# Patient Record
(demographics unavailable — no encounter records)

---

## 2022-01-01 NOTE — PROGRESS NOTE PEDS - NS_NEODISCHDATA_OBGYN_N_OB_FT
Immunizations:        Synagis:       Screenings:    Latest CCHD screen:      Latest car seat screen:      Latest hearing screen:        [place] screen:

## 2022-01-01 NOTE — CONSULT NOTE PEDS - SUBJECTIVE AND OBJECTIVE BOX
CHIEF COMPLAINT: fetal alert for infra-TAPVR    HISTORY OF PRESENT ILLNESS: MARIN JANG is a 0d old female with prenatal concern for infradiaphragmatic TAPVR. The pregnancy was further complicated by placental percreta and delivery was a planned CS @ 34 weeks. During the delivery, it was noted that meconium was spilling from umbilicus concerning for bowel perforation of unclear etiology. Patient had labile hemodynamics with saturations in the 60s, BPs 40/20s. . Decision made to place infant on [facility] ECMO emergently.    REVIEW OF SYSTEMS:  Unobtainable. Critically ill  placed on ECMO @ birth.    PAST MEDICAL HISTORY:  Medical Problems -   Allergies - none known    PAST SURGICAL HISTORY:  ECMO on hour of life 1.    MEDICATIONS:    FAMILY HISTORY:  There is no history of congenital heart disease, arrhythmias, or sudden cardiac death in family members.    SOCIAL HISTORY:  The patient lives with family.    PHYSICAL EXAMINATION:  Vital signs -   T(C): --  HR: --  BP: --  ABP: --  RR: --  SpO2: --  CVP(mm Hg): --  General - non-dysmorphic appearance, well-developed, in no distress.  Skin - no rash, no cyanosis.  Eyes / ENT - no conjunctival injection, external ears & nares normal, mucous membranes moist.  Pulmonary - normal inspiratory effort, no retractions, lungs clear to auscultation bilaterally, no wheezes, no rales.  Cardiovascular - normal rate, regular rhythm, normal S1 & S2, no murmurs, no rubs, no gallops, capillary refill < 2sec, normal pulses.  Gastrointestinal - soft, non-distended, non-tender, no hepatomegaly.  Musculoskeletal - no clubbing, no edema.  Neurologic / Psychiatric - moves all extremities, normal tone.    IMAGING STUDIES:  Electrocardiogram - (*date)     Telemetry - (*dates) normal sinus rhythm, no ectopy, no arrhythmias.    Chest x-ray - (*date) * cardiac silhouette, * pulmonary vascular markings.    Echocardiogram - (22) CHIEF COMPLAINT: fetal alert for infra-TAPVR    HISTORY OF PRESENT ILLNESS: MARIN JANG is a 0d old female with prenatal concern for infradiaphragmatic TAPVR. The pregnancy was further complicated by placental percreta and delivery was a planned CS @ 34 weeks. During the delivery, it was noted that meconium was spilling from umbilicus concerning for bowel perforation of unclear etiology. Patient had labile hemodynamics with saturations in the 60s, BPs 40/20s. . Decision made to place infant on [facility] ECMO emergently.    REVIEW OF SYSTEMS:  Unobtainable. Critically ill  placed on ECMO @ birth.    PAST MEDICAL HISTORY:  Medical Problems -   Allergies - none known    PAST SURGICAL HISTORY:  ECMO on hour of life 1.    MEDICATIONS:    FAMILY HISTORY:  There is no history of congenital heart disease, arrhythmias, or sudden cardiac death in family members.    SOCIAL HISTORY:  The patient lives with family.    PHYSICAL EXAMINATION:  Vital signs -   T(C): --  HR: 160  SpO2: non-pulsatile, on full flow VA ECMO    General - intubated, sedated, paralyzed  Skin - no rash, no cyanosis.  Eyes / ENT - external ears & nares normal  Pulmonary -  lungs clear to auscultation bilaterally  Cardiovascular - normal rate, regular rhythm, normal S1 & S2, normal pulses.  Gastrointestinal - soft, non-distended, non-tender, no hepatomegaly.  Musculoskeletal - no clubbing, no edema.  Neurologic / Psychiatric - intubated, sedated, paralyzed    IMAGING STUDIES:  Electrocardiogram - (*date)     Telemetry - (*dates) normal sinus rhythm, no ectopy, no arrhythmias.    Chest x-ray - (*date) * cardiac silhouette, * pulmonary vascular markings.    Echocardiogram - (22) PRELIM (full report to follow) Infradiaphragmatic TAPVR with 4 PV draining into vertical vein. The vertical vein travels inferiorly and drains into the portal venous system pre-ductus venosus. There appears to be laimar flow through the ductus venosus without obstruction at time of echocardiogram. Otherwise, normal segmental anatomy. Large VSD. Small ASD. During the study, on full flow ECMO, severely depressed biventricular function. When on CI of .8, improved function. Venous cannula @ IVC/RA junction. Arterial cannula @ brachiocephalic-aortic junction. CHIEF COMPLAINT: fetal alert for infra-TAPVR    HISTORY OF PRESENT ILLNESS: MARIN JANG is a 0d old female with prenatal concern for infradiaphragmatic TAPVR. The pregnancy was further complicated by placental percreta and delivery was a planned CS @ 34 weeks. During the delivery, it was noted that meconium was spilling from umbilicus concerning for bowel perforation of unclear etiology. Patient had labile hemodynamics with saturations in the 60s, BPs 40/20s. . Decision made to place infant on VA ECMO emergently.    REVIEW OF SYSTEMS:  Unobtainable. Critically ill  placed on ECMO @ birth.    PAST MEDICAL HISTORY:  Medical Problems -   Allergies - none known    PAST SURGICAL HISTORY:  ECMO on hour of life 1.    MEDICATIONS:    FAMILY HISTORY:  There is no history of congenital heart disease, arrhythmias, or sudden cardiac death in family members.    SOCIAL HISTORY:  The patient lives with family.    PHYSICAL EXAMINATION:  Vital signs -   T(C): --  HR: 160  SpO2: non-pulsatile, on full flow [facility] ECMO    General - intubated, sedated, paralyzed  Skin - no rash, no cyanosis.  Eyes / ENT - external ears & nares normal  Pulmonary -  lungs clear to auscultation bilaterally  Cardiovascular - normal rate, regular rhythm, normal S1 & S2, normal pulses.  Gastrointestinal - soft, non-distended, non-tender, no hepatomegaly.  Musculoskeletal - no clubbing, no edema.  Neurologic / Psychiatric - intubated, sedated, paralyzed    IMAGING STUDIES:  Echocardiogram - (22)   Summary:   1. Patient on ECMO - full flow.   2. 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 All the pulmonary veins connect to the vertical vein that courses inferiorly, crosses the diaphragm, enters the portal venous system - then likely ductus venosus and finally enters the right atrium at the hepatic -IVC junction. There is some distance between the upper and lower pulmonary veins as they enter the vertical vein.      There is flow turbulence noted at the portal venous - ductus venosus - hepatic connection suggesting narrowing; however, there is no significant gradient on Doppler interrogation in context of patient being on full flow ECMO.   3. The venous cannula is in the IVC-RA junction.   4. Small atrial communication with right to left shunt.   5. Large perimembranous ventricular septal defect extending posteriorly into the muscular ventricular septum with bidirectional shunting.   6. Large left patent ductus arteriosus with continuous left to right shunt.   7. Left aortic arch with the first vessel coursing rightwards. However, the branching of the first head and neck vessel is not visualized. Cannot rule out the possibility of aberrant right subclavian artery.      Aortic cannula seen in the distal ascending aorta.   8. Mild aortic valve regurgitation.   9. Significant systolic flattening of the interventricular septum.  10. Qualitatively, moderate to severely decreased left ventricular systolic function on full flow ECMO. There is progressive improvement in systolic function as the flows were decreased ( from CI of 1.3 to CI of 0.8). There was also improved pulsatility in the descending aorta Doppler on decreasing the flows.  11. Moderate to severely depressed RV systolic function qualitatively on full flow ECMO) - with improvement in function seen on reduction of flows.  12. No pericardial effusion.

## 2022-01-01 NOTE — PROGRESS NOTE PEDS - SUBJECTIVE AND OBJECTIVE BOX
INTERVAL HISTORY: On VA-ECMO. Noted to have free air under diaphragm, abdominal drain placed by surgery team. Has also received ~150cc/kg volume resuscitation since cannulation.    BACKGROUND INFORMATION: MARIN JANG is a 0d old female with prenatal concern for infradiaphragmatic TAPVR. The pregnancy was further complicated by placental percreta and delivery was a planned CS @ 34 weeks. During the delivery, it was noted that meconium was spilling from umbilicus concerning for bowel perforation of unclear etiology. Patient had labile hemodynamics with saturations in the 60s, BPs 40/20s. . Decision made to place infant on VA ECMO emergently.  PRIMARY CARDIOLOGIST:   CARDIAC DIAGNOSIS:    OTHER MEDICAL PROBLEMS:   ADMISSION DATE:   SURGICAL DATE:   DISCHARGE DATE: pending    BRIEF HOSPITAL COURSE:   (34.1wk) male with prenatal concern for infradiaphragmatic TAPVR. The pregnancy was further complicated by placental percreta and delivery was a planned CS @ 34 weeks. During the delivery, it was noted that meconium was spilling from umbilicus concerning for bowel perforation of unclear etiology. Patient had labile hemodynamics with saturations in the 60s, BPs 40/20s. . Decision made to place infant on VA ECMO emergently.  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 All the pulmonary veins connect to the vertical vein that courses inferiorly, crosses the diaphragm, enters the portal venous system - then likely ductus venosus and finally enters the right atrium at the hepatic -IVC junction.   RESP: Patient intubated and placed on SIMV with PS, RR (machine): 15, FiO2: 55, PEEP: 10, PS: 10, ITime: 0.35, MAP: 11, PIP: 21  FEN/GI/RENAL: Noted to have free air under diaphragm, abdominal drain placed by surgery team (). Has also received ~150cc/kg volume resuscitation since cannulation. NPO (- ). Taken to the OR on   NEURO:     CURRENT INFORMATION  INTAKE/OUTPUT:   @ 07:01  -   @ 07:00  --------------------------------------------------------  IN: 510.6 mL / OUT: 169 mL / NET: 341.6 mL    MEDICATIONS:  piperacillin/tazobactam IV Intermittent - Peds 210 milliGRAM(s) IV Intermittent every 12 hours  fentaNYL   Infusion - Peds 1 MICROgram(s)/kG/Hr IV Continuous <Continuous>  veCURonium Infusion - Peds 0.1 mG/kG/Hr IV Continuous <Continuous>  famotidine IV Intermittent - Peds 1.1 milliGRAM(s) IV Intermittent every 24 hours  dextrose 10% + sodium chloride 0.225%. -  250 milliLiter(s) IV Continuous <Continuous>  heparin   Infusion -  Peds 15.3 Unit(s)/kG/Hr IV Continuous <Continuous>  sodium chloride 0.9% -  250 milliLiter(s) IV Continuous <Continuous>    PHYSICAL EXAMINATION:  Vital signs - Weight (kg): 2.12 ( @ 16:35)  T(C): 37.3 (22 @ 05:00), Max: 37.3 (22 @ 00:00)  HR: 162 (22 @ 07:00) (65 - 162)  ABP:  (49/39 - 62/59)  RR: 26 (22 @ 07:00) (0 - 34)  SpO2: 98% (22 @ 07:00) (90% - 100%)    General - non-dysmorphic, well-developed, on VA-ECMO  Skin - no rash, no cyanosis.  Eyes / ENT - external appearance of eyes, ears, & nares normal.  Pulmonary - normal inspiratory effort, no retractions, lungs clear bilaterally, no wheezes, no rales.  Cardiovascular - normal rate, regular rhythm, normal S1 & S2, no murmurs, no rubs, no gallops, capillary refill < 2sec, normal pulses.  Gastrointestinal - soft, no hepatomegaly.  Musculoskeletal - no clubbing, no edema.  Neurologic / Psychiatric - moves all extremities, normal tone.    LABORATORY TESTS                          12.7  CBC:   3.83 )-----------( 119   (22 @ 05:15)                          36.8               139   |  108   |  13                 Ca: 7.4    BMP:   ----------------------------< 161    Mg: x     (22 @ 05:15)             4.0    |  20    | 0.61               Ph: x        LFT:     TPro: 3.0 / Alb: 1.9 / TBili: 5.0 / DBili: x / AST: 37 / ALT: 9 / AlkPhos: 52   (22 @ 05:15)    COAG: PT: 19.0 / PTT: >200.0 / INR: 1.63   (22 @ 05:15)     ABG:   pH: 7.34 / pCO2: 40 / pO2: 288 / HCO3: 22 / Base Excess: -3.9 / SaO2: 99.2 / Lactate: x / iCa: x   (22 @ 05:14)    IMAGING STUDIES:  Telemetry   Chest x-ray - (22) Stable external tubes and lines described as above.  Moderate amount of intraperitoneal free air.    Echocardiogram -   1. Patient on ECMO - full flow.   2. 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 All the pulmonary veins connect to the vertical vein that courses inferiorly, crosses the diaphragm, enters the portal venous system - then likely ductus venosus and finally enters the right atrium at the hepatic -IVC junction. There is some distance between the upper and lower pulmonary veins as they enter the vertical vein.      There is flow turbulence noted at the portal venous - ductus venosus - hepatic connection suggesting narrowing; however, there is no significant gradient on Doppler interrogation in context of patient being on full flow ECMO.   3. The venous cannula is in the IVC-RA junction.   4. Small atrial communication with right to left shunt.   5. Large perimembranous ventricular septal defect extending posteriorly into the muscular ventricular septum with bidirectional shunting.   6. Large left patent ductus arteriosus with continuous left to right shunt.   7. Left aortic arch with the first vessel coursing rightwards. However, the branching of the first head and neck vessel is not visualized. Cannot rule out the possibility of aberrant right subclavian artery.      Aortic cannula seen in the distal ascending aorta.   8. Mild aortic valve regurgitation.   9. Significant systolic flattening of the interventricular septum.  10. Qualitatively, moderate to severely decreased left ventricular systolic function on full flow ECMO. There is progressive improvement in systolic function as the flows were decreased ( from CI of 1.3 to CI of 0.8). There was also improved pulsatility in the descending aorta Doppler on decreasing the flows.  11. Moderate to severely depressed RV systolic function qualitatively on full flow ECMO) - with improvement in function seen on reduction of flows.  12. No pericardial effusion.

## 2022-01-01 NOTE — H&P PEDIATRIC - HISTORY OF PRESENT ILLNESS
ex34.1 wk male born via scheduled  CS for fetal alert of percreta to a 37 yo  mom. Maternal history of anemia on iron supplementation, hypothyroid on synthroid 50mcg daily. Prenatal course complicated by placenta previa and placenta accreta spectrum c/f placenta percreta. Fetal ECHO significant for infradiaphragmatic TAPVR, coventricular VSD, PFO vs. ASD and borderline RA enlargement. Betamethasone given -. Male infant born via scheduled repeat c/s in main OR due to maternal complications. AROM at delivery with clear fluid. Male infant born with spontaneous cry. Delayed cord clamping x30 seconds. W/D/S/S. Cyanosis with poor perfusion noted. Oxygen saturations initially in the 50s. HR always >100. Intubated by 2 MOL with 3.0 ETT secured at 7.5 cm. Positive color change on CO2 detector and equal bilateral breath sounds. Given PPV via neopuff with highest settings 25/5, FiO2 35%. Oxygen saturations remained in the 50s and 60s. 2 PIVs placed. Due to poor perfusion and BP means <25 given 10 ml/kg normal saline bolus and 10 ml/kg PRBCs with slight improvement in capillary refill noted. APGARs 8/8 at 1 and 5 minutes respectively. Due to persistent cyanosis and poor perfusion, infant transported via neopuff with pressures 20/5, 35% FiO2 to OR for ECMO cannulation with CT surgery team. O2 saturations remained in 60s during transport. Of note on physical exam, umbilical cord appeared bulbous. Cord was cut down in small increments and meconium noted in umbilical cord. 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 They will continue to follow. NICU attending ALONDRA Lepe in attendance. ex34.1 wk male born via scheduled  CS for fetal alert of percreta to a 37 yo  mom. Maternal history of anemia on iron supplementation, hypothyroid on synthroid 50mcg daily. Prenatal course complicated by placenta previa and placenta accreta spectrum c/f placenta percreta. Fetal ECHO significant for infradiaphragmatic TAPVR, coventricular VSD, PFO vs. ASD and borderline RA enlargement. Betamethasone given -. Male infant born via scheduled repeat c/s in main OR due to maternal complications. AROM at delivery with clear fluid. Male infant born with spontaneous cry. Delayed cord clamping x30 seconds. W/D/S/S. Cyanosis with poor perfusion noted. Oxygen saturations initially in the 50s. HR always >100. Intubated by 2 MOL with 3.0 ETT secured at 7.5 cm. Positive color change on CO2 detector and equal bilateral breath sounds. Given PPV via neopuff with highest settings 25/5, FiO2 35%. Oxygen saturations remained in the 50s and 60s. 2 PIVs placed. Due to poor perfusion and BP means <25 given 10 ml/kg normal saline bolus and 10 ml/kg PRBCs with slight improvement in capillary refill noted. APGARs 8/8 at 1 and 5 minutes respectively. Due to persistent cyanosis and poor perfusion, infant transported via neopuff with pressures 20/5, 35% FiO2 to OR for ECMO cannulation with CT surgery team. O2 saturations remained in 60s during transport. No vasoactives required, received fent, rocuronium,     Of note on physical exam, umbilical cord appeared bulbous. Cord was cut down in small increments and meconium noted in umbilical cord. 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 ex34.1 wk male born via scheduled  CS for fetal alert of percreta to a 35 yo  mom. Maternal history of anemia on iron supplementation, hypothyroid on synthroid 50mcg daily. Prenatal course complicated by placenta previa and placenta accreta spectrum c/f placenta percreta. Fetal ECHO significant for infradiaphragmatic TAPVR, coventricular VSD, PFO vs. ASD and borderline RA enlargement. Betamethasone given -. Male infant born via scheduled repeat c/s in main OR due to maternal complications. AROM at delivery with clear fluid. Male infant born with spontaneous cry. Delayed cord clamping x30 seconds. W/D/S/S. Cyanosis with poor perfusion noted. Oxygen saturations initially in the 50s. HR always >100. Intubated by 2 MOL with 3.0 ETT secured at 7.5 cm. Positive color change on CO2 detector and equal bilateral breath sounds. Given PPV via neopuff with highest settings 25/5, FiO2 35%. Oxygen saturations remained in the 50s and 60s. 2 PIVs placed. Due to poor perfusion and BP means <25 given 10 ml/kg normal saline bolus and 10 ml/kg PRBCs with slight improvement in capillary refill noted. APGARs 8/8 at 1 and 5 minutes respectively. Due to persistent cyanosis, poor perfusion, and repeatedly elevated lactate, infant transported via neopuff with pressures 20/5, 35% FiO2 to OR for ECMO cannulation with CT surgery team. O2 saturations remained in 60s during transport. No vasoactives required, received fent for sedation, rocuronium for paralysis. Blood pressure responded well to LR and NS boluses. Started on a D10 infusion for lower glucoses.     Of note on physical exam, umbilical cord appeared bulbous. Cord was cut down in small increments and meconium noted in umbilical cord. 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

## 2022-01-01 NOTE — PROGRESS NOTE PEDS - NS_NEODAILYDATA_OBGYN_N_OB_FT
Age: 1d  LOS: 1d    Vital Signs:    T(C): 37 (22 @ 16:00), Max: 37.3 (22 @ 00:00)  HR: 129 (22 @ 16:00) (65 - 172)  BP: --  RR: 15 (22 @ 16:00) (0 - 32)  SpO2: 94% (22 @ 16:00) (62% - 100%)    Medications:    chlorhexidine 2% Topical Cloths - Peds 1 Application(s) daily  dextrose 10% + sodium chloride 0.225%. -  250 milliLiter(s) <Continuous>  famotidine IV Intermittent - Peds 1.1 milliGRAM(s) every 24 hours  fentaNYL    IV Intermittent - Peds 2.1 MICROGram(s) every 1 hour PRN  fentaNYL   Infusion - Peds 1 MICROgram(s)/kG/Hr <Continuous>  heparin   Infusion -  Peds 15.3 Unit(s)/kG/Hr <Continuous>  lidocaine 1% Local Injection - Peds 2 milliLiter(s) once  Parenteral Nutrition -  Starter Bag- dextrose 10% 250 milliLiter(s) <Continuous>  piperacillin/tazobactam IV Intermittent - Peds 210 milliGRAM(s) every 12 hours  sodium chloride 0.9% -  250 milliLiter(s) <Continuous>  veCURonium  IV Push - Peds 0.21 milliGRAM(s) every 1 hour PRN  veCURonium Infusion - Peds 0.1 mG/kG/Hr <Continuous>      Labs:  Blood type, Baby Cord: [ @ 16:01] N/A  Blood type, Baby:  @ 16:01 ABO: No Type Determined Rh:IND DC:Negative                12.1   3.61 )---------( 82   [ @ 12:35]            34.1  S:N/A%  B:N/A% [place]:N/A% Myelo:N/A% Promyelo:N/A%  Blasts:N/A% Lymph:N/A% Mono:N/A% Eos:N/A% Baso:N/A% Retic:N/A%            12.7   3.83 )---------( 119   [ @ 05:15]            36.8  S:N/A%  B:N/A% [place]:N/A% Myelo:N/A% Promyelo:N/A%  Blasts:N/A% Lymph:N/A% Mono:N/A% Eos:N/A% Baso:N/A% Retic:N/A%    136  |104  |15     --------------------(145     [ @ 12:35]  3.6  |20   |0.64     Ca:6.7   Mg:N/A   Phos:N/A    139  |108  |13     --------------------(161     [ @ 05:15]  4.0  |20   |0.61     Ca:7.4   Mg:N/A   Phos:N/A      Bili T/D [ @ 05:15] - 5.0/N/A    Alkaline Phosphatase [] - 52 Albumin [] - 1.9, Albumin [] - 1.5   AST:37 | ALT:9 | GGT:N/A       POCT Glucose:      Coagulation Profile: PT: 21.3 sec | PTT:66.8 sec | INR:1.83 ratio          AB @ 15:23  pH:7.30  / pCO2:43    / pO2:71    / HCO3:21    / Base Excess:-5.1 / SaO2:96.9  / Lactate:N/A        VBG 22 @ 15:23  pH:N/A / pCO2:N/A / pO2:N/A / HCO3:N/A / Base Excess:N/A / Hematocrit: 37.0

## 2022-01-01 NOTE — CONSULT NOTE PEDS - ATTENDING COMMENTS
we assessed in OR, open bowel exposed within umbilicus, based on remnant cut by NICU during attempted placement of UV line this could be omphalomesenteric duct, cannot rule out free bowel injury that would cause intra-abdo sepsis. Difficult to assess, if OM duct only, drainage could be contained to extraabdominal, but once again, difficult to assess. We recommended operative repair if feasible to completely evaluate and fix any bowel injury. Cardiac team felt that ECMO was urgently needed, hence we could not move forward with repair. We offered to suture the bowel, however cardiac team said they would do this before or after cannulation. Our plan will be to see if baby declares intra-abdo sepsis or perforation post ECMO, and repair on ECMO later if needed, as need for ECMO currently is immediate. It is possible that if OM duct, sides are still fused to umbilical fascia and no intra-abdo sepsis will result, but difficult to tell at this time.     We will follow closely.
I have personally seen and examined the patient.  I fully participated in the care of this patient.  I have made amendments to the documentation where necessary, and agree with the history, physical exam, and plan as documented by the Fellow.

## 2022-01-01 NOTE — H&P PEDIATRIC - ATTENDING COMMENTS
1d old male with prenatal concern for infradiaphragmatic TAPVR and maternal placental percreta, s/p planned CS @ 34 weeks on 8/29/22. Baby cried initially but then noted to be mottled and intubated. During UVC placement, noted to have meconium from umbilical vein and procedure aborted. Gen Surgery has suspicion for meckel's diverticulum. Patient with worsening hypoxia and labile hemodynamics and cannulated emergently to [facility] ECMO. Birth weight: 2.1kg.    On exam, baby is small, AFOF, no periorbital edema, ETT in place. ECMO cannulas- R neck. Faint heart sounds. Good aeration bilaterally. Abd soft, NTND, nonpalpable liver. Umbilical stump with stitches. Extremities warm centrally with 2+pulses, cool distally. No spontaneous movement.    Labs and XRays all reviewed.    Plan:  Neuro-   Fentanyl infusion- titrate to appropriate sedation needs  Tylenol PRN  Avoid Toradol    Resp-   Full vent settings-> wean to "rest" vent settings  CXR now and AM  ABG Q1H until lactate cleared then Q4-6    CV-  ECMO CI 1.2, full flows  ABG Q1H until lactate cleared then Q4-6  EKG now  ECHO to be reviewed    FEN-  NPO/IVF  Monitor for hypoglycemia  Pepcid      ID-  Zosyn as per Gen Surg recs    Heme-  labs per ECMO protocol  transfusion per ECMO protocol     Head US  Renal US    Awaiting further Gen Surg recs  Appreciate NICU recs    Plan discussed with Peds Cardiology and CT Surgery.     CCM 90 minutes 1d old male with prenatal concern for infradiaphragmatic TAPVR and maternal placental percreta, s/p planned CS @ 34 weeks on 8/29/22. Baby cried initially but then noted to be mottled and intubated. During UVC placement, noted to have meconium from umbilical vein and procedure aborted. Gen Surgery has suspicion for meckel's diverticulum. Patient with worsening hypoxia and labile hemodynamics and cannulated emergently to VA ECMO. Birth weight: 2.1kg.    On exam, baby is small, AFOF, no periorbital edema, ETT in place. ECMO cannulas- R neck. Faint heart sounds. Good aeration bilaterally. Abd soft, NTND, nonpalpable liver. Umbilical stump with stitches. Extremities warm centrally with 2+pulses, cool distally. No spontaneous movement.    Labs and XRays all reviewed.    Plan:  Neuro-   Fentanyl infusion- titrate to appropriate sedation needs  Tylenol PRN  Avoid Toradol    Resp-   Full vent settings-> wean to "rest" vent settings  CXR now and AM  ABG Q1H until lactate cleared then Q4-6    CV-  ECMO CI 1.2, full flows  ABG Q1H until lactate cleared then Q4-6  EKG now  ECHO to be reviewed    FEN-  NPO/IVF  Monitor for hypoglycemia  Pepcid    AXR Q6 to r/o perforation    ID-  Zosyn as per Gen Surg recs    Heme-  labs per ECMO protocol  transfusion per ECMO protocol   Will start Heparin now and titrate per ECMO protocol  Vit K PO as per NICU recs     Head US  Renal US    Awaiting further Gen Surg recs  Appreciate NICU recs    Plan discussed with Peds Cardiology and CT Surgery.     CCM 90 minutes

## 2022-01-01 NOTE — H&P PEDIATRIC - ASSESSMENT
0d old female with fetal alert for infradiaphragmatic TAPVR. The pregnancy was further complicated by placental percreta and delivery was a planned CS @ 34 weeks. During the delivery, it was noted that meconium was spilling from umbilicus concerning for bowel perforation of unclear etiology. Patient had labile hemodynamics with saturations in the 60s, BPs 40/20s. . Decision made to place infant on VA ECMO emergently. 1d old female with fetal alert for infradiaphragmatic TAPVR. The pregnancy was further complicated by placental percreta and delivery was a planned CS @ 34 weeks. During the delivery, it was noted that meconium was spilling from umbilicus concerning for bowel perforation of unclear etiology. Patient had labile hemodynamics with saturations in the 60s, BPs 40/20s. . Decision made to place infant on VA ECMO emergently. 1d old female with fetal alert for infradiaphragmatic TAPVR. The pregnancy was further complicated by placental percreta and delivery was a planned CS @ 34 weeks. During the delivery, it was noted that meconium was spilling from umbilicus concerning for bowel perforation of unclear etiology. Patient had labile hemodynamics with saturations in the 60s, BPs 40/20s. . Decision made to place infant on VA ECMO emergently.    Resp  -SIMV 15/5 RR 15  -ABG Q1H until lactate cleared then Q4-6  -CXR now and AM      CV  -ECMO  -ABG Q1H until lactate cleared then Q4-6 (last was 2.7)  -EKG  -ECHO to be reviewed, repeat tomorrow?    FEN  -NPO/IVF  -Pepcid  -AXR Q6    Neuro  -Fentanyl gtt  -Tylenol PRN  -Avoid Toradol    ID  -Zosyn for 48hrs per Gen Surg    Heme  -labs per ECMO protocol  -transfusion per ECMO protocol   -Will start Heparin now and titrate per ECMO protocol  -Vit K PO as per NICU recs     -Head US  -Renal US

## 2022-01-01 NOTE — PROGRESS NOTE PEDS - NS_NEOHPI_OBGYN_ALL_OB_FT
Date of Birth: 22	  Admission Weight (g): 2120    Admission Date and Time:  22 @ 09:49         Gestational Age: 34     Source of admission [ x ] Inborn     [ __ ]Transport from    [facility]:  ex34.1 wk male born via scheduled  CS for fetal alert of percreta to a 35 yo  mom. Maternal history of anemia on iron supplementation, hypothyroid on synthroid 50mcg daily. Prenatal course complicated by placenta previa and placenta accreta spectrum c/f placenta percreta. Fetal ECHO significant for infradiaphragmatic TAPVR, conoventricular VSD, PFO vs. ASD and borderline RA enlargement. Betamethasone given -. Male infant born via scheduled repeat c/s in main OR due to maternal complications. AROM at delivery with clear fluid. Male infant born with spontaneous cry. Delayed cord clamping x30 seconds. W/D/S/S. Cyanosis with poor perfusion noted. Oxygen saturations initially in the 50s. HR always >100. Intubated by 2 MOL with 3.0 ETT secured at 7.5 cm. Positive color change on CO2 detector and equal bilateral breath sounds. Given PPV via neopuff with highest settings 25/5, FiO2 35%. Oxygen saturations remained in the 50s and 60s. 2 PIVs placed. Due to poor perfusion and BP means <25 given 10 ml/kg normal saline bolus and 10 ml/kg PRBCs with slight improvement in capillary refill noted. APGARs 8/8 at 1 and 5 minutes respectively. Due to persistent cyanosis, poor perfusion, and repeatedly elevated lactate, infant transported via neopuff with pressures 20/5, 35% FiO2 to OR for ECMO cannulation with CT surgery team. O2 saturations remained in 60s during transport. No vasoactives required, received fent for sedation, rocuronium for paralysis. Blood pressure responded well to LR and NS boluses. Started on a D10 infusion for lower glucoses.     Of note on physical exam, umbilical cord appeared bulbous. Cord was cut down in small increments and meconium noted in umbilical cord. 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 (29 Aug 2022 14:02)      Social History: No history of alcohol/tobacco exposure obtained  FHx: non-contributory to the condition being treated or details of FH documented here  ROS: unable to obtain ()

## 2022-01-01 NOTE — PROGRESS NOTE PEDS - ASSESSMENT
Patient is a 0d old girl with prenatal diagnosis of TAPVR needing post- ECMO. During transection of umbilical cord in attempt at umbilical vein cannulation, and end of intestine was seen protruding from the wound, with succus draining.  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  The cardiac team needed to proceed emergently to ECMO thus would not have time for definitive repair at this time.  Dr. Wu would perform a sutured ligation of the exposed end of bowel.  Pediatric surgery to follow closely after OR, recommend continuing IV Abx (Zosyn) post-op.  This is unlikely to be a transected omphalocele with intra-abdominal transected bowel, but recommend gram negative and anaerobe coverage for the time being.    Plan:                 pediatric surgery

## 2022-01-01 NOTE — CHART NOTE - NSCHARTNOTEFT_GEN_A_CORE
Demetria Foster Case Reference #: [id number], spoke with Adele
Paged to patient's bedside. Providers were performing compressions and manually ventilating the patient. Found ECMO cannula to be dislodged. Proceeded to hold pressure over cannulation site while compressions continued.
Post Operative Note  Patient: MARIN JANG 1d (2022) Male   MRN: [id number]  Location: [facility]  AP  Visit: 22 Inpatient  Date: 22 @ 21:34    Procedure: S/P PICU bedside ex lap with small bowel resection and hand sewn anastamosis.     Subjective:   Patient lying comfortably in bed. No acute distress.     Objective:  Vitals: T(F): 98.4 (22 @ 20:00), Max: 99.1 (22 @ 00:00)  HR: 125 (22 @ 20:00)  BP: --  RR: 15 (22 @ 20:00)  SpO2: 95% (22 20:00)  Vent Settings: Mode: SIMV with PS, RR (machine): 15, FiO2: 25, PEEP: 10, PS: 10, MAP: 11, PC: 10, PIP: 20    In:   22 @ 07:01  -  22 @ 07:00  --------------------------------------------------------  IN: 510.6 mL    22 @ 07:01  -  22 @ 21:34  --------------------------------------------------------  IN: 251.5 mL      IV Fluids: sodium bicarbonate 4.2% IV Intermittent - Peds 2.1 milliEquivalent(s) IV Intermittent once  sodium chloride 0.9% -  250 milliLiter(s) (1.5 mL/Hr) IV Continuous <Continuous>      Out:   22 @ 07:01  -  22 @ 07:00  --------------------------------------------------------  OUT: 169 mL    22 @ 07:  -  22 @ 21:34  --------------------------------------------------------  OUT: 116 mL      EBL:   22 @ 07:  -  22 @ 07:00  --------------------------------------------------------  OUT: 34 mL    22 @ 07:  -  22 @ 21:34  --------------------------------------------------------  OUT: 18 mL      Voided Urine:   22 @ 07:  -  22 @ 07:00  --------------------------------------------------------  OUT: 169 mL    22 @ 07:  -  22 @ 21:34  --------------------------------------------------------  OUT: 116 mL      Rosario Catheter: yes no   Drains:   COLEMAN:   22 @ 07:  -  22 @ 07:00  --------------------------------------------------------  OUT: 12 mL    22 @ 07:01  -  22 @ 21:34  --------------------------------------------------------  OUT: 55 mL     ,   Chest Tube:      NG Tube:       Physical Examination:  General Appearance: NAD  HEENT: NCAT  Heart: rrr  Lungs: Clear to auscultation BL   Abdomen:  Soft, nondistended, nontender. No masses palpated. No McBurney point tenderness.  Neuro: sedated and intubated  Skin: Warm/dry  Wounds/Incions: periumbilical incisions w/ dressings in place, clean, dry, intact, no signs of infection    Medications: [Standing]  chlorhexidine 2% Topical Cloths - Peds 1 Application(s) Topical daily  famotidine IV Intermittent - Peds 1.1 milliGRAM(s) IV Intermittent every 24 hours  fentaNYL    IV Intermittent - Peds 2.1 MICROGram(s) IV Intermittent every 1 hour PRN  fentaNYL   Infusion - Peds 1 MICROgram(s)/kG/Hr IV Continuous <Continuous>  heparin   Infusion -  Peds 15.3 Unit(s)/kG/Hr IV Continuous <Continuous>  lidocaine 1% Local Injection - Peds 2 milliLiter(s) Local Injection once  Parenteral Nutrition -  Starter Bag- dextrose 10% 250 milliLiter(s) TPN Continuous <Continuous>  piperacillin/tazobactam IV Intermittent - Peds 210 milliGRAM(s) IV Intermittent every 12 hours  sodium bicarbonate 4.2% IV Intermittent - Peds 2.1 milliEquivalent(s) IV Intermittent once  sodium chloride 0.9% -  250 milliLiter(s) IV Continuous <Continuous>    Medications: [PRN]  chlorhexidine 2% Topical Cloths - Peds 1 Application(s) Topical daily  famotidine IV Intermittent - Peds 1.1 milliGRAM(s) IV Intermittent every 24 hours  fentaNYL    IV Intermittent - Peds 2.1 MICROGram(s) IV Intermittent every 1 hour PRN  fentaNYL   Infusion - Peds 1 MICROgram(s)/kG/Hr IV Continuous <Continuous>  heparin   Infusion -  Peds 15.3 Unit(s)/kG/Hr IV Continuous <Continuous>  lidocaine 1% Local Injection - Peds 2 milliLiter(s) Local Injection once  Parenteral Nutrition -  Starter Bag- dextrose 10% 250 milliLiter(s) TPN Continuous <Continuous>  piperacillin/tazobactam IV Intermittent - Peds 210 milliGRAM(s) IV Intermittent every 12 hours  sodium bicarbonate 4.2% IV Intermittent - Peds 2.1 milliEquivalent(s) IV Intermittent once  sodium chloride 0.9% -  250 milliLiter(s) IV Continuous <Continuous>    Labs:                        12.1   3.61  )-----------( 82       ( 30 Aug 2022 12:35 )             34.1         136  |  104  |  15  ----------------------------<  145<H>  3.6   |  20<L>  |  0.64    Ca    6.7<L>      30 Aug 2022 12:35    TPro  3.0<L>  /  Alb  1.9<L>  /  TBili  5.0<L>  /  DBili  x   /  AST  37  /  ALT  9   /  AlkPhos  52<L>      PT/INR - ( 30 Aug 2022 17:10 )   PT: 22.9 sec;   INR: 1.96 ratio         PTT - ( 30 Aug 2022 17:10 )  PTT:79.8 sec      Imaging:  No post-op imaging studies    Assessment:  1dMale patient S/P PICU bedside ex lap with small bowel resection and hand sewn anastamosis.     Plan:  -continue abx  -hold feeds linda now    Date/Time: 22 @ 21:34
Spoke with Medical Examiner Office. Awaiting a call back for further information regarding whether or not this is an ME case.    Kat Hamilton, PGY5
 with obstructed infradiaphragmatic TAPVR, s/p VA ECMO cannulation yesterday, who developed bowel perforation and exploratory laparotomy this afternoon.  During the evening the pre-membrane pressure was rising (initial gradient pre/post about 60), requiring fluid boluses and adjustment of ECMO flow.  Later in the evening, pre-membrane pressures continued to rise with an increasing gradient.  FiO2 increased to membrane, small fluid boluses required to maintain SpO2.  Patient positioning did not alter blood flow. About 23:00 I called ECMO coordinator due to continued increase in pre-membrane pressures, increasing transmembrane gradient, and difficulty wit SpO2, with intention of changing ECMO circuit.  Pt's condition continued to worsen with more difficulty with circuit flow, with pre-membrane pressures hitting >450 with post about 80.  Pt had worsening desaturations to 50s, then became bradycardic.  Epi 1/10 code doses given intermittently first when HR dropped to 70s. With worsening SpO2 and bradycardia, and no ECMO flow due to circuit dysfunction, CPR initiated around 01:11.  Multiple doses epi given approx Q3 min, HCO3 given.  Bleeding at cannula site noted later during CPR.  Peds surgery called, followed by CT surgery (Dr. Glass). While preparing to change to new ECMO circuit, air was noted in venous cannula, which was then determined to be dislodged.  At that point pt had been undergoing good quality CPR for approx 35 min (based on arterial line, ETCO2, SpO2 tracings).  Due to futile situation, it was determined to discontinue all further ECMO efforts, and continue CPR until mother of child arrived from [facility].  Upon her arrival, pt was placed in her arms and full emotional support provided by PICU team.  Pt progressed to  cardiac electrical silence and was declared dead at 01:43.    Kristin called.  ME called.    Chandler Marquez MD  PICU Attending
Abdominal xrays with a large amount of intraperitoneal free air.  After discussion with surgical teams (peds surgery and CT surgery), decision made to place peritoneal drain at bedside by peds surgery. Father at bedside prior to procedure and made aware.  Air evacuated and brown-tinged serous fluid drained.  Patient tolerated procedure without any acute events
Brief operative note ~2pm 8/30    Exploratory laparotomy, omphalomesenteric duct remnant present with separate enterotomy that was present adjacent to base of duct.  Small segment small bowel resection and sutured primary anastomosis completed, abdomen closed.    Pre/post-op diagnosis: Omphalomesenteric duct remnant  Operation: Enterectomy  Attending: Dr. Carver

## 2022-01-01 NOTE — PROGRESS NOTE PEDS - ASSESSMENT
MARIN JANG; First Name: Jeffrey     GA 34+1 weeks;     Age:1d;   PMA: 34+2   BW:  2120   MRN: [id number]    COURSE: infracardiac TAPVR, ECMO, small atrial communication, large perimembranous VSD, persistent omphalomesenteric duct vs ?small omphalocele, intraperitoneal free air s/p Penrose    INTERVAL EVENTS: intubated on ECMO; Penrose placed overnight for intraperitoneal free air, plan ex-lap today    Weight (g): 2120 ( BW )                               Intake (ml/kg/day): 241  Urine output (ml/kg/hr or frequency):    3.2  Stools (frequency): 0  Other: Replogle 6 ml/kg overnight    Growth:    HC (cm): 31.5 (08-29), 31.5 (08-29)           [08-30]  Length (cm):  45; Patrick weight %  ____ ; ADWG (g/day)  _____ .  *******************************************************  Respiratory: Intubated in DR, on SIMV rest settings: 15, 21/10 FiO2 50%. Continuous cardiorespiratory monitoring on ECMO  CV: VA ECMO managed per PICU team. MAPs 50s, goal >35. 8/29 ECHO: infracardiac TAPVR with all 4 pulm veins draining to vertical vein which courses inferiorly below diaphragm to drain into portal system and ductus venosus. Also small PFO vs ASD, large perimembranous VSD, large PDA L>R shunting, moderate-severely decreased biventricular systolic function, improved with reduction of ECMO flows. Definitive repair likely to be delayed for complete healing of intra-abdominal process; would require stenting of ductus venosus in the interim.  ACCESS: arterial and venous ECMO cannulas, PIV X2   FEN: NPO on D10 0.2NS, transition to starter TPN today. Start TPN/IL tomorrow. Has 2 PIVs for access - may attempt additional central access today. POC glucose monitoring as per guideline for prematurity.    Heme: At risk for hyperbilirubinemia due to prematurity - monitor serial bilirubin.  Hct 34 plt 80. Transfusion of blood products per ECMO protocol. Heparin gtt per ECMO protocol.  ID: Presumed sepsis on Zosyn due to bowel perforation. Daily BCx per ECMO protocol. Monitor for signs and symptoms of sepsis.    Neuro: 8/29 HUS wnl. Daily HUS while on ECMO. Cerebral NIRS initially low 70s, now low 80s. Sedated with spontaneous movement on fentanyl gtt. No paralysis this AM but will have vec prn for ex-lap today.  Thermal: Immature thermoregulation requiring radiant warmer to prevent hypothermia.    Social: Parents updated 8/29 (VB/IG) and 8/30 (MP/VB). Ongoing parental support.    This patient requires ICU care including continuous monitoring and frequent vital sign assessment due to significant risk of cardiorespiratory compromise or decompensation outside of the NICU.  NICU.

## 2022-01-01 NOTE — CONSULT NOTE PEDS - SUBJECTIVE AND OBJECTIVE BOX
PEDIATRIC GENERAL SURGERY CONSULT NOTE    Chief Complaint:     HPI: Patient is a 0d old  Female who presents with a chief complaint of in utero diagnosis of TAPVR likely needing post-raya ECMO.  Immediate after C/S for placenta accreta, the NICU team proceeded with a venous access procedure through the umbilicus.  Upon transecting the umbilical cord however, meconium appeared to be emanating from the wound and intestine was thought to be seen through the umbilical defect.    PRENATAL/BIRTH HISTORY:     [] Term   [x] Pre-term   Gest Age (wks): 34	         Apgars:             Birth Wt: 2100g  [] Spontaneous Vaginal Delivery	       []  - reason:    PAST MEDICAL & SURGICAL HISTORY:    [] No significant past history as reviewed with the patient and family    FAMILY HISTORY:    [] Family history not pertinent as reviewed with the patient and family    SOCIAL HISTORY:      ALLERGIES:     HOME MEDICATIONS:     CURRENT MEDICATIONS:  MEDICATIONS (STANDING):   MEDICATIONS (PRN):    REVIEW OF SYSTEMS  All review of systems negative except for those marked.  Systemic:	[] Fever	[] Chills	[] Night sweats	[] Fatigue	[] Other  [] Cardiovascular:  [] Pulmonary:  [] Renal/Urologic:  [] Gastrointestinal:  [] Metabolic:  [] Neurologic:  [] Hematologic:  [] ENT:  [] Ophthalmologic:  [] Musculoskeletal:  ------------------------------------------------------------------------------------------------    VITAL SIGNS  Vital Signs Last 24 Hrs  T(C): --  T(F): --  HR: --  BP: --  BP(mean): --  RR: --  SpO2: --          PHYSICAL EXAM:    General: Pink, perfused-appearing  Pulm: Recently intubated  FEN: Umbilicus transected with end of bowel protruding through center, no other bowel seen. Abdomen soft.  ------------------------------------------------------------------------------------------------    LABS                  MICROBIOLOGY        IMAGING  None    ------------------------------------------------------------------------------------------------

## 2022-01-01 NOTE — PROGRESS NOTE PEDS - NS_NEOPHYSEXAM_OBGYN_N_OB_FT
General:            AGA  infant, asleep/sedated  Head:		AFOF, dependent nuchal edema  Eyes:		Normally set bilaterally, periorbital edema  Ears:		Patent bilaterally, no deformities  Nose/Mouth:	Nares patent, palate intact  Neck:		No masses, intact clavicles. ECMO venous/arterial cannulae in place  Chest/Lungs:     Intubated on SIMV, SCE, breath sounds equal to auscultation. No retractions, breathing over vent rate  CV:		No murmurs appreciated, normal pulses bilaterally, cap refill 3s  Abdomen:          Soft nontender nondistended, no masses, absent bowel sounds  Skin:		Pale, mottled  Neuro exam:	Sedated but +spontaneous movement on exam

## 2022-01-01 NOTE — CONSULT NOTE PEDS - ASSESSMENT
ASSESSMENT  Patient is a 0d old girl with prenatal diagnosis of TAPVR needing post- ECMO. During transection of umbilical cord in attempt at umbilical vein cannulation, and end of intestine was seen protruding from the wound, with succus draining.  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  The cardiac team needed to proceed emergently to ECMO thus would not have time for definitive repair at this time.  Dr. uW would perform a sutured ligation of the exposed end of bowel.  Pediatric surgery to follow closely after OR, recommend continuing IV Abx (Zosyn) post-op.  This is unlikely to be a transected omphalocele with intra-abdominal transected bowel, but recommend gram negative and anaerobe coverage for the time being.    Plan:     - Replogle to suction, IV access, TPN  - Zosyn or equivalent coverage  - Post-operative plain film and left lateral decubitus for baseline imaging  - Surgery team to follow closely  - Appreciate NICU care

## 2022-01-01 NOTE — BRIEF OPERATIVE NOTE - OPERATION/FINDINGS
In PICU at bedside: RLQ peritoneum opened with evacuation of large serous fluid with small amount of brown tinged fluid and air evacuated with intra-peritoneal drain placed

## 2022-01-01 NOTE — PROGRESS NOTE PEDS - NS_NEOMEASUREMENTS_OBGYN_N_OB_FT
GA @ birth: 34  HC(cm): 31.5 (08-29), 31.5 (08-29) | Length(cm):Height (cm): 45 (08-30-22 @ 14:19) | [place] weight % _____ | ADWG (g/day): _____    Current/Last Weight in grams: 2120 (08-29), 2120 (08-29)

## 2022-01-01 NOTE — PROGRESS NOTE PEDS - ATTENDING COMMENTS
agree, overnight free air overnight, drain was placed by overnight team. This am, had long discussion with PICU, cardiac surgery, and our team, we all felt that laparotomy, despite risks, to repair perforation with possible anastomosis or stomas was prudent given need to control any ongoing spillage. Cardiac team planning for repair once bowel is dealt with.. Will hold heparin for short course pre-op, parents aware of grave risks of operation including leak post op or death. They wish to proceed.
Patient seen and examined at the bedside. I reviewed and edited the entire body of the note above so that it reflects my personal, face-to-face involvement in all specified aspects of the patient's care.

## 2022-01-01 NOTE — PROGRESS NOTE PEDS - ASSESSMENT
1 day old male  (34.1wks) male with prenatal concern for infradiaphragmatic TAPVR. 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 He was cannulated to [facility]-ECMO on DOL 0. Noted to have bowel perforation, s/p peritoneal drain 22.  Planned for surgical exploration by surgery today.    Plan:  Cardiores:  -On [facility]-ECMO  -MAP >35    Resp  -On Mech Vent  -ABG Q2H until lactate cleared then Q6    Neuro-  -On Fentanyl infusion  -Normal head US  , will repeat daily    FEN-  NPO/IVF D10  -Start TPN today  - Renal USS normal     ID-  Zosyn as per Suregry    Heme-  -labs per ECMO protocol  -transfusion per ECMO protocol   Heparin infusion- titrate per ECMO protocol

## 2022-01-01 NOTE — PATIENT PROFILE, NEWBORN NICU. - NSPEDSNEONOTESA_OBGYN_ALL_OB_FT
Called to delivery by Dr. Avila for scheduled  c/s for maternal indications and fetal congenital heart disease. 34.1 week male born to a 36 year old  (SABx1), A+, GBS unknown (no rupture/no labor), COVID negative , PNL unremarkable mother. Maternal medical/surgical history significant for right knee surgery, anemia on Fe supplementation and hypothyroid on synthroid 50 mcg daily. Previous c/s x2. Pregnancy history significant for placenta previa and placenta accreta spectrum that is concerning for placenta percreta. Fetal ECHO significant for infradiaphragmatic TAPVR, coventricular VSD, PFO vs. ASD and borderline RA enlargement. Betamethasone given -. Male infant born via scheduled repeat c/s in main OR due to maternal complications. AROM at delivery with clear fluid. Male infant born with spontaneous cry. Delayed cord clamping x30 seconds. W/D/S/S. Cyanosis with poor perfusion noted. Oxygen saturations initially in the 50s. HR always >100. Intubated by 2 MOL with 3.0 ETT secured at 7.5 cm. Positive color change on CO2 detector and equal bilateral breath sounds. Given PPV via neopuff with highest settings 25/5, FiO2 35%. Oxygen saturations remained in the 50s and 60s. 2 PIVs placed. Due to poor perfusion and BP means <25 given 10 ml/kg normal saline bolus and 10 ml/kg PRBCs with slight improvement in capillary refill noted. APGARs 8/8 at 1 and 5 minutes respectively. Due to persistent cyanosis and poor perfusion, infant transported via neopuff with pressures 20/5, 35% FiO2 to OR for ECMO cannulation with CT surgery team. O2 saturations remained in 60s during transport. Of note on physical exam, umbilical cord appeared bulbous. Cord was cut down in small increments and meconium noted in umbilical cord. 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 They will continue to follow. NICU attending ALONDRA Lepe in attendance.

## 2022-01-01 NOTE — PROGRESS NOTE PEDS - SUBJECTIVE AND OBJECTIVE BOX
PEDIATRIC GENERAL SURGERY PROGRESS NOTE    MARIN JANG  |  [id number]      Overnight events: Patient with free air in abd x-ray. Drain placed overnight     S: Patient seen and examined at bedside    O:   Vital Signs Last 24 Hrs  T(C): 36.9 (30 Aug 2022 01:00), Max: 37.3 (30 Aug 2022 00:00)  T(F): 98.4 (30 Aug 2022 01:00), Max: 99.1 (30 Aug 2022 00:00)  HR: 151 (30 Aug 2022 01:00) (65 - 157)  BP: --  BP(mean): --  RR: 12 (30 Aug 2022 01:00) (0 - 34)  SpO2: 100% (30 Aug 2022 01:00) (90% - 100%)    Parameters below as of 30 Aug 2022 01:00  Patient On (Oxygen Delivery Method): conventional ventilator    O2 Concentration (%): 80    PHYSICAL EXAM:    General: Pink, perfused-appearing  Pulm: Recently intubated  FEN: Umbilicus transected with end of bowel protruding through center, no other bowel seen. Abdomen soft.                          14.4   4.86  )-----------( 80       ( 29 Aug 2022 21:06 )             40.5     08-29    143  |  117<H>  |  8   ----------------------------<  111<H>  3.4<L>   |  16<L>  |  0.37    Ca    6.4<LL>      29 Aug 2022 21:06    TPro  x   /  Alb  1.5<L>  /  TBili  x   /  DBili  x   /  AST  x   /  ALT  x   /  AlkPhos  x   08-29 08-29-22 @ 07:01  -  08-30-22 @ 01:39  --------------------------------------------------------  IN: 450.9 mL / OUT: 130 mL / NET: 320.9 mL

## 2022-01-01 NOTE — CONSULT NOTE PEDS - ASSESSMENT
Plan:  - continuos cardiac monitoring  - baseline EKG  - head and renal US  - TTE & cardiac CT when clinically stable  - ECMO care per protocol  - continued plan for congenital heart disease pending clinical stability.   Debra Posadas is a 0 day old ex-34 week infant with infradiaphragmatic TAPVR with 4 veins draining into vertical vein that travels inferiorly into the portal venous system pre-ductus venosus. No obvious obstruction noted at the time of echocardiogram. Additional findings include a large membranous VSD, and small atrial communication. The patient was critically ill in the delivery room and course complicated by possible bowel perforation of unclear etiology. Thus, the decision was made to emergently cannulate onto [facility] ECMO. Patient is criticall ill and needs ongoing monitoring in cardiac ICU.    Plan:  - continuos cardiac monitoring  - baseline EKG  - head and renal US  - TTE & cardiac CT when clinically stable  - ECMO care per protocol  - continued plan for congenital heart disease pending clinical stability.  - genetics to be sent post operatively as patient now exposed to blood products.   Debra Posadas is a 0 day old ex-34 week infant with infradiaphragmatic TAPVR with 4 veins draining into vertical vein that travels inferiorly into the portal venous system pre-ductus venosus. No obvious obstruction noted at the time of echocardiogram. Additional findings include a large membranous VSD, and small atrial communication. The patient was critically ill in the delivery room and course complicated by possible bowel perforation of unclear etiology. Thus, the decision was made to emergently cannulate onto VA ECMO. Patient is criticall ill and needs ongoing monitoring in cardiac ICU.    Plan:  - continuos cardiac monitoring  - baseline EKG  - head and renal US  - ECMO care per protocol  - continued plan for congenital heart disease pending clinical stability.  - genetics to be sent post operatively as patient now exposed to blood products.

## 2022-01-01 NOTE — PROGRESS NOTE PEDS - SUBJECTIVE AND OBJECTIVE BOX
INTERVAL HISTORY:     BACKGROUND INFORMATION  PRIMARY CARDIOLOGIST:   CARDIAC DIAGNOSIS:   OTHER MEDICAL PROBLEMS:   ADMISSION DATE:   SURGICAL DATE:   DISCHARGE DATE: pending    BRIEF HOSPITAL COURSE  CARDIO:   RESP:   FEN/GI/RENAL: *DISCHARGE WEIGHT = *  NEURO:     CURRENT INFORMATION  INTAKE/OUTPUT:   @ 07:01  -   @ 07:00  --------------------------------------------------------  IN: 510.6 mL / OUT: 169 mL / NET: 341.6 mL    MEDICATIONS:  piperacillin/tazobactam IV Intermittent - Peds 210 milliGRAM(s) IV Intermittent every 12 hours  fentaNYL   Infusion - Peds 1 MICROgram(s)/kG/Hr IV Continuous <Continuous>  veCURonium Infusion - Peds 0.1 mG/kG/Hr IV Continuous <Continuous>  famotidine IV Intermittent - Peds 1.1 milliGRAM(s) IV Intermittent every 24 hours  dextrose 10% + sodium chloride 0.225%. -  250 milliLiter(s) IV Continuous <Continuous>  heparin   Infusion -  Peds 15.3 Unit(s)/kG/Hr IV Continuous <Continuous>  sodium chloride 0.9% -  250 milliLiter(s) IV Continuous <Continuous>    PHYSICAL EXAMINATION:  Vital signs - Weight (kg): 2.12 ( @ 16:35)  T(C): 37.3 (22 @ 05:00), Max: 37.3 (22 @ 00:00)  HR: 162 (22 @ 07:00) (65 - 162)  BP: --  ABP:  (49/39 - 62/59)  RR: 26 (22 @ 07:00) (0 - 34)  SpO2: 98% (22 @ 07:00) (90% - 100%)  CVP(mm Hg): --  General - non-dysmorphic, well-developed.  Skin - no rash, no cyanosis.  Eyes / ENT - external appearance of eyes, ears, & nares normal.  Pulmonary - normal inspiratory effort, no retractions, lungs clear bilaterally, no wheezes, no rales.  Cardiovascular - normal rate, regular rhythm, normal S1 & S2, no murmurs, no rubs, no gallops, capillary refill < 2sec, normal pulses.  Gastrointestinal - soft, no hepatomegaly.  Musculoskeletal - no clubbing, no edema.  Neurologic / Psychiatric - moves all extremities, normal tone.    LABORATORY TESTS                          12.7  CBC:   3.83 )-----------( 119   (22 @ 05:15)                          36.8               139   |  108   |  13                 Ca: 7.4    BMP:   ----------------------------< 161    Mg: x     (22 @ 05:15)             4.0    |  20    | 0.61               Ph: x        LFT:     TPro: 3.0 / Alb: 1.9 / TBili: 5.0 / DBili: x / AST: 37 / ALT: 9 / AlkPhos: 52   (22 @ 05:15)    COAG: PT: 19.0 / PTT: >200.0 / INR: 1.63   (22 @ 05:15)     ABG:   pH: 7.34 / pCO2: 40 / pO2: 288 / HCO3: 22 / Base Excess: -3.9 / SaO2: 99.2 / Lactate: x / iCa: x   (22 @ 05:14)  IMAGING STUDIES:  Electrocardiogram - (*date)      Telemetry - (*dates) normal sinus rhythm, no ectopy, no arrhythmias.    Chest x-ray - (*date) * cardiac silhouette, * pulmonary vascular markings.    Echocardiogram - (*date)  INTERVAL HISTORY: On [facility]-ECMO. Free air under diaphragm noted in abd xray suggestive of bowel perforation.      BACKGROUND INFORMATION: MARIN JANG is a 0d old female with prenatal concern for infradiaphragmatic TAPVR. The pregnancy was further complicated by placental percreta and delivery was a planned CS @ 34 weeks. During the delivery, it was noted that meconium was spilling from umbilicus concerning for bowel perforation of unclear etiology. Patient had labile hemodynamics with saturations in the 60s, BPs 40/20s. . Decision made to place infant on VA ECMO emergently.  PRIMARY CARDIOLOGIST:   CARDIAC DIAGNOSIS:    OTHER MEDICAL PROBLEMS:   ADMISSION DATE:   SURGICAL DATE:   DISCHARGE DATE: pending    BRIEF HOSPITAL COURSE  CARDIO:   RESP:   FEN/GI/RENAL: *DISCHARGE WEIGHT = *  NEURO:     CURRENT INFORMATION  INTAKE/OUTPUT:   @ 07:01  -   @ 07:00  --------------------------------------------------------  IN: 510.6 mL / OUT: 169 mL / NET: 341.6 mL    MEDICATIONS:  piperacillin/tazobactam IV Intermittent - Peds 210 milliGRAM(s) IV Intermittent every 12 hours  fentaNYL   Infusion - Peds 1 MICROgram(s)/kG/Hr IV Continuous <Continuous>  veCURonium Infusion - Peds 0.1 mG/kG/Hr IV Continuous <Continuous>  famotidine IV Intermittent - Peds 1.1 milliGRAM(s) IV Intermittent every 24 hours  dextrose 10% + sodium chloride 0.225%. -  250 milliLiter(s) IV Continuous <Continuous>  heparin   Infusion -  Peds 15.3 Unit(s)/kG/Hr IV Continuous <Continuous>  sodium chloride 0.9% -  250 milliLiter(s) IV Continuous <Continuous>    PHYSICAL EXAMINATION:  Vital signs - Weight (kg): 2.12 ( @ 16:35)  T(C): 37.3 (22 @ 05:00), Max: 37.3 (22 @ 00:00)  HR: 162 (22 @ 07:00) (65 - 162)  BP: --  ABP:  (49/39 - 62/59)  RR: 26 (22 @ 07:00) (0 - 34)  SpO2: 98% (22 @ 07:00) (90% - 100%)  CVP(mm Hg): --  General - non-dysmorphic, well-developed.  Skin - no rash, no cyanosis.  Eyes / ENT - external appearance of eyes, ears, & nares normal.  Pulmonary - normal inspiratory effort, no retractions, lungs clear bilaterally, no wheezes, no rales.  Cardiovascular - normal rate, regular rhythm, normal S1 & S2, no murmurs, no rubs, no gallops, capillary refill < 2sec, normal pulses.  Gastrointestinal - soft, no hepatomegaly.  Musculoskeletal - no clubbing, no edema.  Neurologic / Psychiatric - moves all extremities, normal tone.    LABORATORY TESTS                          12.7  CBC:   3.83 )-----------( 119   (22 @ 05:15)                          36.8               139   |  108   |  13                 Ca: 7.4    BMP:   ----------------------------< 161    Mg: x     (22 @ 05:15)             4.0    |  20    | 0.61               Ph: x        LFT:     TPro: 3.0 / Alb: 1.9 / TBili: 5.0 / DBili: x / AST: 37 / ALT: 9 / AlkPhos: 52   (22 @ 05:15)    COAG: PT: 19.0 / PTT: >200.0 / INR: 1.63   (22 @ 05:15)     ABG:   pH: 7.34 / pCO2: 40 / pO2: 288 / HCO3: 22 / Base Excess: -3.9 / SaO2: 99.2 / Lactate: x / iCa: x   (22 @ 05:14)  IMAGING STUDIES:  Electrocardiogram - (*date)      Telemetry - (*dates) normal sinus rhythm, no ectopy, no arrhythmias.    Chest x-ray - (*date) * cardiac silhouette, * pulmonary vascular markings.    Echocardiogram - (*date)  INTERVAL HISTORY: On VA-ECMO. Noted to have free air under diaphragm, abdominal drain placed by surgery team. Has also received ~150cc/kg volume resuscitation since cannulation.    BACKGROUND INFORMATION: MARIN JANG is a 0d old female with prenatal concern for infradiaphragmatic TAPVR. The pregnancy was further complicated by placental percreta and delivery was a planned CS @ 34 weeks. During the delivery, it was noted that meconium was spilling from umbilicus concerning for bowel perforation of unclear etiology. Patient had labile hemodynamics with saturations in the 60s, BPs 40/20s. . Decision made to place infant on [facility] ECMO emergently.  PRIMARY CARDIOLOGIST:   CARDIAC DIAGNOSIS:    OTHER MEDICAL PROBLEMS:   ADMISSION DATE:   SURGICAL DATE:   DISCHARGE DATE: pending    BRIEF HOSPITAL COURSE:   (34.1wk) male with prenatal concern for infradiaphragmatic TAPVR. The pregnancy was further complicated by placental percreta and delivery was a planned CS @ 34 weeks. During the delivery, it was noted that meconium was spilling from umbilicus concerning for bowel perforation of unclear etiology. Patient had labile hemodynamics with saturations in the 60s, BPs 40/20s. . Decision made to place infant on [facility] ECMO emergently.  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 All the pulmonary veins connect to the vertical vein that courses inferiorly, crosses the diaphragm, enters the portal venous system - then likely ductus venosus and finally enters the right atrium at the hepatic -IVC junction.   RESP: Patient intubated and placed on SIMV with PS, RR (machine): 15, FiO2: 55, PEEP: 10, PS: 10, ITime: 0.35, MAP: 11, PIP: 21  FEN/GI/RENAL: Noted to have free air under diaphragm, abdominal drain placed by surgery team (). Has also received ~150cc/kg volume resuscitation since cannulation. NPO (- ). Taken to the OR on   NEURO:     CURRENT INFORMATION  INTAKE/OUTPUT:   @ 07:01  -   @ 07:00  --------------------------------------------------------  IN: 510.6 mL / OUT: 169 mL / NET: 341.6 mL    MEDICATIONS:  piperacillin/tazobactam IV Intermittent - Peds 210 milliGRAM(s) IV Intermittent every 12 hours  fentaNYL   Infusion - Peds 1 MICROgram(s)/kG/Hr IV Continuous <Continuous>  veCURonium Infusion - Peds 0.1 mG/kG/Hr IV Continuous <Continuous>  famotidine IV Intermittent - Peds 1.1 milliGRAM(s) IV Intermittent every 24 hours  dextrose 10% + sodium chloride 0.225%. -  250 milliLiter(s) IV Continuous <Continuous>  heparin   Infusion -  Peds 15.3 Unit(s)/kG/Hr IV Continuous <Continuous>  sodium chloride 0.9% -  250 milliLiter(s) IV Continuous <Continuous>    PHYSICAL EXAMINATION:  Vital signs - Weight (kg): 2.12 ( @ 16:35)  T(C): 37.3 (22 @ 05:00), Max: 37.3 (22 @ 00:00)  HR: 162 (22 @ 07:00) (65 - 162)  ABP:  (49/39 - 62/59)  RR: 26 (22 @ 07:00) (0 - 34)  SpO2: 98% (22 @ 07:00) (90% - 100%)    General - non-dysmorphic, well-developed, on VA-ECMO  Skin - no rash, no cyanosis.  Eyes / ENT - external appearance of eyes, ears, & nares normal.  Pulmonary - normal inspiratory effort, no retractions, lungs clear bilaterally, no wheezes, no rales.  Cardiovascular - normal rate, regular rhythm, normal S1 & S2, no murmurs, no rubs, no gallops, capillary refill < 2sec, normal pulses.  Gastrointestinal - soft, no hepatomegaly.  Musculoskeletal - no clubbing, no edema.  Neurologic / Psychiatric - moves all extremities, normal tone.    LABORATORY TESTS                          12.7  CBC:   3.83 )-----------( 119   (22 @ 05:15)                          36.8               139   |  108   |  13                 Ca: 7.4    BMP:   ----------------------------< 161    Mg: x     (22 @ 05:15)             4.0    |  20    | 0.61               Ph: x        LFT:     TPro: 3.0 / Alb: 1.9 / TBili: 5.0 / DBili: x / AST: 37 / ALT: 9 / AlkPhos: 52   (22 @ 05:15)    COAG: PT: 19.0 / PTT: >200.0 / INR: 1.63   (22 @ 05:15)     ABG:   pH: 7.34 / pCO2: 40 / pO2: 288 / HCO3: 22 / Base Excess: -3.9 / SaO2: 99.2 / Lactate: x / iCa: x   (22 @ 05:14)    IMAGING STUDIES:  Telemetry   Chest x-ray - (22) Stable external tubes and lines described as above.  Moderate amount of intraperitoneal free air.    Echocardiogram -   1. Patient on ECMO - full flow.   2. 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 All the pulmonary veins connect to the vertical vein that courses inferiorly, crosses the diaphragm, enters the portal venous system - then likely ductus venosus and finally enters the right atrium at the hepatic -IVC junction. There is some distance between the upper and lower pulmonary veins as they enter the vertical vein.      There is flow turbulence noted at the portal venous - ductus venosus - hepatic connection suggesting narrowing; however, there is no significant gradient on Doppler interrogation in context of patient being on full flow ECMO.   3. The venous cannula is in the IVC-RA junction.   4. Small atrial communication with right to left shunt.   5. Large perimembranous ventricular septal defect extending posteriorly into the muscular ventricular septum with bidirectional shunting.   6. Large left patent ductus arteriosus with continuous left to right shunt.   7. Left aortic arch with the first vessel coursing rightwards. However, the branching of the first head and neck vessel is not visualized. Cannot rule out the possibility of aberrant right subclavian artery.      Aortic cannula seen in the distal ascending aorta.   8. Mild aortic valve regurgitation.   9. Significant systolic flattening of the interventricular septum.  10. Qualitatively, moderate to severely decreased left ventricular systolic function on full flow ECMO. There is progressive improvement in systolic function as the flows were decreased ( from CI of 1.3 to CI of 0.8). There was also improved pulsatility in the descending aorta Doppler on decreasing the flows.  11. Moderate to severely depressed RV systolic function qualitatively on full flow ECMO) - with improvement in function seen on reduction of flows.  12. No pericardial effusion.

## 2022-01-01 NOTE — PROGRESS NOTE PEDS - SUBJECTIVE AND OBJECTIVE BOX
POST ANESTHESIA EVALUATION    1d Male POSTOP DAY 1    MENTAL STATUS: Patient participation [ ] Awake     [  ] Arousable     [  ] Sedated  [ x ] Other:  on ECMO      AIRWAY PATENCY: [  ] Satisfactory  [ x ] Other: remains on ECMO    Vital Signs Last 24 Hrs  T(C): 36.7 (30 Aug 2022 08:00), Max: 37.3 (30 Aug 2022 00:00)  T(F): 98 (30 Aug 2022 08:00), Max: 99.1 (30 Aug 2022 00:00)  HR: 145 (30 Aug 2022 11:) (65 - 162)  BP: --  BP(mean): --  RR: 25 (30 Aug 2022 11:00) (0 - 34)  SpO2: 65% (30 Aug 2022 11:27) (65% - 100%)    Parameters below as of 30 Aug 2022 11:  Patient On (Oxygen Delivery Method): conventional ventilator    O2 Concentration (%): 35  I&O's Summary    29 Aug 2022 07:01  -  30 Aug 2022 07:00  --------------------------------------------------------  IN: 510.6 mL / OUT: 169 mL / NET: 341.6 mL    30 Aug 2022 07:01  -  30 Aug 2022 11:41  --------------------------------------------------------  IN: 35.4 mL / OUT: 54 mL / NET: -18.6 mL          NAUSEA/ VOMITTING:  [ x ] NONE  [  ] CONTROLLED [  ] OTHER     PAIN: [ x ] CONTROLLED WITH CURRENT REGIMEN  [  ] OTHER    [ x ] NO APPARENT ANESTHESIA COMPLICATIONS      Comments: ECMO care per PICU

## 2022-01-01 NOTE — H&P PEDIATRIC - NSHPPHYSICALEXAM_GEN_ALL_CORE
Physical Exam:  Gen: sedated, paralyzed post op  HEENT: anterior fontanel open soft and flat, no obvious cleft lip/palate, ears normal set, no ear pits or tags. no obvious lesions in mouth/throat, nares clinically patent, cannulated to ECMO  Resp: no increased work of breathing, good air entry b/l, clear to auscultation bilaterally  Cardio: Normal S1/S2, regular rate and rhythm, no murmurs, rubs or gallops  Abd: soft, non tender, non distended, + bowel sounds, umbilical stump sutured closed  Neuro: unable to assess  Extremities: negative leija and ortolani, paralyzed  Skin: pink, warm

## 2022-01-01 NOTE — DISCHARGE NOTE FOR THE EXPIRED PATIENT - HOSPITAL COURSE
ex34.1 wk male born via scheduled  CS for fetal alert of percreta to a 37 yo  mom. Maternal history of anemia on iron supplementation, hypothyroid on synthroid 50mcg daily. Prenatal course complicated by placenta previa and placenta accreta spectrum c/f placenta percreta. Fetal ECHO significant for infradiaphragmatic TAPVR, coventricular VSD, PFO vs. ASD and borderline RA enlargement. Betamethasone given -. Male infant born via scheduled repeat c/s in main OR due to maternal complications. AROM at delivery with clear fluid. Male infant born with spontaneous cry. Delayed cord clamping x30 seconds. W/D/S/S. Cyanosis with poor perfusion noted. Oxygen saturations initially in the 50s. HR always >100. Intubated by 2 MOL with 3.0 ETT secured at 7.5 cm. Positive color change on CO2 detector and equal bilateral breath sounds. Given PPV via neopuff with highest settings 25/5, FiO2 35%. Oxygen saturations remained in the 50s and 60s. 2 PIVs placed. Due to poor perfusion and BP means <25 given 10 ml/kg normal saline bolus and 10 ml/kg PRBCs with slight improvement in capillary refill noted. APGARs 8/8 at 1 and 5 minutes respectively. Due to persistent cyanosis, poor perfusion, and repeatedly elevated lactate, infant transported via neopuff with pressures 20/5, 35% FiO2 to OR for ECMO cannulation with CT surgery team. O2 saturations remained in 60s during transport. No vasoactives required, received fent for sedation, rocuronium for paralysis. Blood pressure responded well to LR and NS boluses. Started on a D10 infusion for lower glucoses. On physical exam, umbilical cord appeared bulbous. Cord was cut down in small increments and meconium noted in umbilical cord. 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    [place] with obstructed infradiaphragmatic TAPVR, s/p [facility] ECMO cannulation yesterday, who developed bowel perforation and exploratory laparotomy this afternoon.  During the evening the pre-membrane pressure was rising (initial gradient pre/post about 60), requiring fluid boluses and adjustment of ECMO flow.  Later in the evening, pre-membrane pressures continued to rise with an increasing gradient.  FiO2 increased to membrane, small fluid boluses required to maintain SpO2.  Patient positioning did not alter blood flow. About 23:00 I called ECMO coordinator due to continued increase in pre-membrane pressures, increasing transmembrane gradient, and difficulty wit SpO2, with intention of changing ECMO circuit.  Pt's condition continued to worsen with more difficulty with circuit flow, with pre-membrane pressures hitting >450 with post about 80.  Pt had worsening desaturations to 50s, then became bradycardic.  Epi 1/10 code doses given intermittently first when HR dropped to 70s. With worsening SpO2 and bradycardia, and no ECMO flow due to circuit dysfunction, CPR initiated around 01:11.  Multiple doses epi given approx Q3 min, HCO3 given.  Bleeding at cannula site noted later during CPR.  Peds surgery called, followed by CT surgery (Dr. Glass). While preparing to change to new ECMO circuit, air was noted in venous cannula, which was then determined to be dislodged.  At that point pt had been undergoing good quality CPR for approx 35 min (based on arterial line, ETCO2, SpO2 tracings).  Due to futile situation, it was determined to discontinue all further ECMO efforts, and continue CPR until mother of child arrived from [facility].  Upon her arrival, pt was placed in her arms and full emotional support provided by PICU team.  Pt progressed to  cardiac electrical silence and was declared dead at 01:43.   ex34.1 wk male born via scheduled  CS for fetal alert of percreta to a 35 yo  mom. Maternal history of anemia on iron supplementation, hypothyroid on synthroid 50mcg daily. Prenatal course complicated by placenta previa and placenta accreta spectrum c/f placenta percreta. Fetal ECHO significant for infradiaphragmatic TAPVR, coventricular VSD, PFO vs. ASD and borderline RA enlargement. Betamethasone given -. Male infant born via scheduled repeat c/s in main OR due to maternal complications. AROM at delivery with clear fluid. Male infant born with spontaneous cry. Delayed cord clamping x30 seconds. W/D/S/S. Cyanosis with poor perfusion noted. Oxygen saturations initially in the 50s. HR always >100. Intubated by 2 MOL with 3.0 ETT secured at 7.5 cm. Positive color change on CO2 detector and equal bilateral breath sounds. Given PPV via neopuff with highest settings 25/5, FiO2 35%. Oxygen saturations remained in the 50s and 60s. 2 PIVs placed. Due to poor perfusion and BP means <25 given 10 ml/kg normal saline bolus and 10 ml/kg PRBCs with slight improvement in capillary refill noted. APGARs 8/8 at 1 and 5 minutes respectively. Due to persistent cyanosis, poor perfusion, and repeatedly elevated lactate, infant transported via neopuff with pressures 20/5, 35% FiO2 to OR for ECMO cannulation with CT surgery team. O2 saturations remained in 60s during transport. No vasoactives required, received fent for sedation, rocuronium for paralysis. Blood pressure responded well to LR and NS boluses. Started on a D10 infusion for lower glucoses. On physical exam, umbilical cord appeared bulbous. Cord was cut down in small increments and meconium noted in umbilical cord. 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 Bowel perforation was noted on followup abdominal xrays and exploratory laparotomy was performed .  During the evening the pre-membrane pressure was rising (initial gradient pre/post about 60), requiring fluid boluses and adjustment of ECMO flow.  Later in the evening, pre-membrane pressures continued to rise with an increasing gradient.  FiO2 increased to membrane, small fluid boluses required to maintain SpO2.  Patient positioning did not alter blood flow. About 23:00 I called ECMO coordinator due to continued increase in pre-membrane pressures, increasing transmembrane gradient, and difficulty wit SpO2, with intention of changing ECMO circuit.  Pt's condition continued to worsen with more difficulty with circuit flow, with pre-membrane pressures hitting >450 with post about 80.  Pt had worsening desaturations to 50s, then became bradycardic.  Epi 1/10 code doses given intermittently first when HR dropped to 70s. With worsening SpO2 and bradycardia, and no ECMO flow due to circuit dysfunction, CPR initiated around 01:11.  Multiple doses epi given approx Q3 min, HCO3 given.  Bleeding at cannula site noted later during CPR.  Peds surgery called, followed by CT surgery (Dr. Glass). While preparing to change to new ECMO circuit, air was noted in venous cannula, which was then determined to be dislodged.  At that point pt had been undergoing good quality CPR for approx 35 min (based on arterial line, ETCO2, SpO2 tracings).  Due to futile situation, it was determined to discontinue all further ECMO efforts, and continue CPR until mother of child arrived from [facility].  Upon her arrival, pt was placed in her arms and full emotional support provided by PICU team.  Pt progressed to cardiac electrical silence and was declared dead at 01:43.

## 2022-01-01 NOTE — PROGRESS NOTE PEDS - SUBJECTIVE AND OBJECTIVE BOX
Interval/Overnight Events:    VITAL SIGNS:  T(C): 36.7 (22 @ 08:00), Max: 37.3 (22 @ 00:00)  HR: 148 (22 @ 08:00) (65 - 162)  BP: --  ABP: 56/53 (22 @ 06:00) (49/39 - 62/59)  ABP(mean): 56 (22 @ 08:00) (36 - 71)  RR: 26 (22 @ 08:00) (0 - 34)  SpO2: 67% (22 @ 08:00) (67% - 100%)  CVP(mm Hg): --  End-Tidal CO2:  NIRS:    ===============================RESPIRATORY==============================  [ ] FiO2: ___ 	[ ] Heliox: ____ 		[ ] BiPAP: ___   [ ] NC: __  Liters			[ ] HFNC: __ 	Liters, FiO2: __  [ ] Mechanical Ventilation: Mode: SIMV with PS, RR (machine): 15, FiO2: 80, PEEP: 10, PS: 10, ITime: 0.35, MAP: 11, PIP: 21  [ ] Inhaled Nitric Oxide:  Respiratory Medications:    [ ] Extubation Readiness Assessed  Comments:    =============================CARDIOVASCULAR============================  Cardiovascular Medications:    Chest Tube Output: ___ in 24 hours, ___ in last 12 hours   [ ] Right     [ ] Left    [ ] Mediastinal  Cardiac Rhythm:	[x] NSR		[ ] Other:    [ ] Central Venous Line	[ ] R	[ ] L	[ ] IJ	[ ] Fem	[ ] SC			Placed:   [ ] Arterial Line		[ ] R	[ ] L	[ ] PT	[ ] DP	[ ] Fem	[ ] Rad	[ ] Ax	Placed:   [ ] PICC:				[ ] Broviac		[ ] Mediport  Comments:    =========================HEMATOLOGY/ONCOLOGY=========================  Transfusions:	[ ] PRBC	[ ] Platelets	[ ] FFP		[ ] Cryoprecipitate  DVT Prophylaxis:  Comments:    ============================INFECTIOUS DISEASE===========================  [ ] Cooling [place] being used. Target Temperature:     ======================FLUIDS/ELECTROLYTES/NUTRITION=====================  I&O's Summary    29 Aug 2022 07:01  -  30 Aug 2022 07:00  --------------------------------------------------------  IN: 510.6 mL / OUT: 169 mL / NET: 341.6 mL    30 Aug 2022 07:  -  30 Aug 2022 08:34  --------------------------------------------------------  IN: 6.4 mL / OUT: 25 mL / NET: -18.6 mL      Daily Weight Gm: 2120 (29 Aug 2022 16:35)  Diet:	[ ] Regular	[ ] Soft		[ ] Clears	[ ] NPO  .	[ ] Other:  .	[ ] NGT		[ ] NDT		[ ] GT		[ ] GJT    [ ] Urinary Catheter, Date Placed:   Comments:    ==============================NEUROLOGY===============================  [ ] SBS:		[ ] MYRIAM-1:	[ ] BIS:	[ ] CAPD:  [ ] EVD set at: ___ , Drainage in last 24 hours: ___ ml    Neurologic Medications:  fentaNYL    IV Intermittent - Peds 2.1 MICROGram(s) IV Intermittent every 1 hour PRN  fentaNYL   Infusion - Peds 1 MICROgram(s)/kG/Hr IV Continuous <Continuous>  veCURonium  IV Push - Peds 0.21 milliGRAM(s) IV Push every 1 hour PRN  veCURonium Infusion - Peds 0.1 mG/kG/Hr IV Continuous <Continuous>    [x] Adequacy of sedation and pain control has been assessed and adjusted  Comments:    MEDICATIONS:  Hematologic/Oncologic Medications:  aminocaproic acid Infusion - Peds 25 mG/kG/Hr IV Continuous <Continuous>  heparin   Infusion -  Peds 15.3 Unit(s)/kG/Hr IV Continuous <Continuous>  tranexamic acid  IV Intermittent (Bolus) - Peds 21 milliGRAM(s) IV Bolus once  tranexamic acid Infusion - Peds 5 mG/kG/Hr IV Continuous <Continuous>  Antimicrobials/Immunologic Medications:  piperacillin/tazobactam IV Intermittent - Peds 210 milliGRAM(s) IV Intermittent every 12 hours  Gastrointestinal Medications:  dextrose 10% + sodium chloride 0.225%. -  250 milliLiter(s) IV Continuous <Continuous>  famotidine IV Intermittent - Peds 1.1 milliGRAM(s) IV Intermittent every 24 hours  sodium chloride 0.9% -  250 milliLiter(s) IV Continuous <Continuous>  Endocrine/Metabolic Medications:  Genitourinary Medications:  Topical/Other Medications:  chlorhexidine 2% Topical Cloths - Peds 1 Application(s) Topical daily  lidocaine 1% Local Injection - Peds 2 milliLiter(s) Local Injection once      =============================PATIENT CARE==============================  [ ] There are pressure ulcers/areas of breakdown that are being addressed?  [x] Preventative measures are being taken to decrease risk for skin breakdown.  [x] Necessity of urinary, arterial, and venous catheters discussed    =============================PHYSICAL EXAM=============================  On exam, baby is small, AFOF, no periorbital edema, ETT in place. ECMO cannulas- R neck.   Faint heart sounds.   Good aeration bilaterally.   Abd soft, NTND, nonpalpable liver.   Umbilical stump with stitches.   Extremities warm centrally with 2+pulses, cool distally.   No spontaneous movement.    =======================================================================  I have personally reviewed and interpreted all labs, EKGs and imaging studies.    LABS:  ABG - ( 30 Aug 2022 05:14 )  pH: 7.34  /  pCO2: 40    /  pO2: 288   / HCO3: 22    / Base Excess: -3.9  /  SaO2: 99.2  / Lactate: x                                                12.7                  Neurophils% (auto):   x      ( @ 05:15):    3.83 )-----------(119          Lymphocytes% (auto):  x                                             36.8                   Eosinphils% (auto):   x        Manual%: Neutrophils x    ; Lymphocytes x    ; Eosinophils x    ; Bands%: x    ; Blasts x        (  @ 05:15 )   PT: 19.0 sec;   INR: 1.63 ratio  aPTT: >200.0 sec                            139    |  108    |  13                  Calcium: 7.4   / iCa: 1.10   ( @ 05:15)    ----------------------------<  161       Magnesium: x                                4.0     |  20     |  0.61             Phosphorous: x        TPro  3.0    /  Alb  1.9    /  TBili  5.0    /  DBili  x      /  AST  37     /  ALT  9      /  AlkPhos  52     30 Aug 2022 05:15  RECENT CULTURES:      IMAGING STUDIES:    Parent/Guardian is at the bedside:	[ ] Yes	[ ] No  Patient and Parent/Guardian updated as to the progress/plan of care:	[ ] Yes	[ ] No    [ ] The patient is in critical and unstable condition and requires ICU care and monitoring  [ ] The patient requires continued monitoring and adjustment of therapy    [ ] The total critical care time spent by attending physician was __ minutes, excluding procedure time. I have rounded with the subspecialists taking care of this patient.  Interval/Overnight Events: Cannulated to ECMO, noted to have free air on AXR- abd drain placed by Surgery. Given ~150cc/kg volume resuscitation since cannulation.     VITAL SIGNS:  T(C): 36.7 (22 @ 08:00), Max: 37.3 (22 @ 00:00)  HR: 148 (22 @ 08:00) (65 - 162)  ABP: 56/53 (22 @ 06:00) (49/39 - 62/59)  ABP(mean): 56 (22 @ 08:00) (36 - 71)  RR: 26 (22 @ 08:00) (0 - 34)  SpO2: 67% (22 @ 08:00) (67% - 100%)  End-Tidal CO2: 20s  NIRS: C 70-80s    ===============================RESPIRATORY==============================  [X ] Mechanical Ventilation: Mode: SIMV with PS, RR (machine): 15, FiO2: 55, PEEP: 10, PS: 10, ITime: 0.35, MAP: 11, PIP: 21    [X ] Extubation Readiness Assessed  Comments: Not a candidate at this time     =============================CARDIOVASCULAR============================  Cardiac Rhythm:	[x] NSR		[ ] Other:    [X ] Arterial Line		[ ] R	[X ] L	[ ] PT	[ ] DP	[ ] Fem	[X] Rad	[ ] Ax	Placed: 22  2 PIV    =========================HEMATOLOGY/ONCOLOGY=========================  Transfusions:	[X ] PRBC	[X ] Platelets	[ ] FFP		[ ] Cryoprecipitate  DVT Prophylaxis: Heparin   Comments: for circuit     ============================INFECTIOUS DISEASE===========================  [X] Warmer being used. Target Temperature: 36    ======================FLUIDS/ELECTROLYTES/NUTRITION=====================  I&O's Summary    29 Aug 2022 07:  -  30 Aug 2022 07:00  --------------------------------------------------------  IN: 510.6 mL / OUT: 169 mL / NET: 341.6 mL    30 Aug 2022 07:  -  30 Aug 2022 08:34  --------------------------------------------------------  IN: 6.4 mL / OUT: 25 mL / NET: -18.6 mL    Penrose drain 22cc    Daily Weight Gm: 2120 (29 Aug 2022 16:35)  Diet:	[ ] Regular	[ ] Soft		[ ] Clears	[X ] NPO  .	[ ] Other:  .	[ ] NGT		[ ] NDT		[ ] GT		[ ] GJT    [X ] Urinary Catheter, Date Placed: 22  Comments:    ==============================NEUROLOGY===============================  [X ] SBS: 0 to -1 		    Neurologic Medications:  fentaNYL    IV Intermittent - Peds 2.1 MICROGram(s) IV Intermittent every 1 hour PRN  fentaNYL   Infusion - Peds 1 MICROgram(s)/kG/Hr IV Continuous <Continuous>  veCURonium  IV Push - Peds 0.21 milliGRAM(s) IV Push every 1 hour PRN  veCURonium Infusion - Peds 0.1 mG/kG/Hr IV Continuous <Continuous>    [x] Adequacy of sedation and pain control has been assessed and adjusted  Comments:    MEDICATIONS:  Hematologic/Oncologic Medications:  aminocaproic acid Infusion - Peds 25 mG/kG/Hr IV Continuous <Continuous>  heparin   Infusion -  Peds 15.3 Unit(s)/kG/Hr IV Continuous <Continuous>  tranexamic acid  IV Intermittent (Bolus) - Peds 21 milliGRAM(s) IV Bolus once  tranexamic acid Infusion - Peds 5 mG/kG/Hr IV Continuous <Continuous>  Antimicrobials/Immunologic Medications:  piperacillin/tazobactam IV Intermittent - Peds 210 milliGRAM(s) IV Intermittent every 12 hours  Gastrointestinal Medications:  dextrose 10% + sodium chloride 0.225%. -  250 milliLiter(s) IV Continuous <Continuous>  famotidine IV Intermittent - Peds 1.1 milliGRAM(s) IV Intermittent every 24 hours  sodium chloride 0.9% -  250 milliLiter(s) IV Continuous <Continuous>  Endocrine/Metabolic Medications:  Genitourinary Medications:  Topical/Other Medications:  chlorhexidine 2% Topical Cloths - Peds 1 Application(s) Topical daily  lidocaine 1% Local Injection - Peds 2 milliLiter(s) Local Injection once    =============================PATIENT CARE==============================  [ ] There are pressure ulcers/areas of breakdown that are being addressed?  [x] Preventative measures are being taken to decrease risk for skin breakdown.  [x] Necessity of urinary, arterial, and venous catheters discussed    =============================PHYSICAL EXAM=============================  On exam, baby is small and mottled   AFOF, significant periorbital edema, ETT in place.   ECMO cannulas- R neck.   Faint heart sounds.   Good aeration bilaterally.   Abd soft, ND, nonpalpable liver, +penrose drain   Umbilical stump with stitches  Extremities cool with poor perfusion, delayed capillary refill   +spontaneous movement on exam     =======================================================================  I have personally reviewed and interpreted all labs, EKGs and imaging studies.    LABS:  ABG - ( 30 Aug 2022 05:14 )  pH: 7.34  /  pCO2: 40    /  pO2: 288   / HCO3: 22    / Base Excess: -3.9  /  SaO2: 99.2  / Lactate: x                                                12.7                  Neurophils% (auto):   x      ( @ 05:15):    3.83 )-----------(119          Lymphocytes% (auto):  x                                             36.8                   Eosinphils% (auto):   x        Manual%: Neutrophils x    ; Lymphocytes x    ; Eosinophils x    ; Bands%: x    ; Blasts x        (  @ 05:15 )   PT: 19.0 sec;   INR: 1.63 ratio  aPTT: >200.0 sec                            139    |  108    |  13                  Calcium: 7.4   / iCa: 1.10   ( @ 05:15)    ----------------------------<  161       Magnesium: x                                4.0     |  20     |  0.61             Phosphorous: x        TPro  3.0    /  Alb  1.9    /  TBili  5.0    /  DBili  x      /  AST  37     /  ALT  9      /  AlkPhos  52     30 Aug 2022 05:15  RECENT CULTURES:      IMAGING STUDIES:  AXR +free air  CXR poor aeration bilaterally, ETT high    Echocardiogram, Pediatric (Echocardiogram, Pediatric .) (22 @ 10:31) >  Summary:   1. Patient on ECMO - full flow.   2. 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 All the pulmonary veins connect to the vertical vein that courses inferiorly, crosses the diaphragm, enters the portal venous system - then likely ductus venosus and finally enters the right atrium at the hepatic -IVC junction. There is some distance between the upper and lower pulmonary veins as they enter the vertical vein.      There is flow turbulence noted at the portal venous - ductus venosus - hepatic connection suggesting narrowing; however, there is no significant gradient on Doppler interrogation in context of patient being on full flow ECMO.   3. The venous cannula is in the IVC-RA junction.   4. Small atrial communication with right to left shunt.   5. Large perimembranous ventricular septal defect extending posteriorly into the muscular ventricular septum with bidirectional shunting.   6. Large left patent ductus arteriosus with continuous left to right shunt.   7. Left aortic arch with the first vessel coursing rightwards. However, the branching of the first head and neck vessel is not visualized. Cannot rule out the possibility of aberrant right subclavian artery.      Aortic cannula seen in the distal ascending aorta.   8. Mild aortic valve regurgitation.   9. Significant systolic flattening of theinterventricular septum.  10. Qualitatively, moderate to severely decreased left ventricular systolic function on full flow ECMO. There is progressive improvement in systolic function as the flows were decreased ( from CI of 1.3 to CI of 0.8). There was also improved pulsatility in the descending aorta Doppler on decreasing the flows.  11. Moderate to severely depressed RV systolic function qualitatively on full flow ECMO) - with improvement in function seen on reduction of flows.  12. No pericardial effusion.    Parent/Guardian is at the bedside:	[ ] Yes	[X] No  Patient and Parent/Guardian updated as to the progress/plan of care:	[X ] Yes	[ ] No    [X ] The patient is in critical and unstable condition and requires ICU care and monitoring  [ ] The patient requires continued monitoring and adjustment of therapy    [X ] The total critical care time spent by attending physician was 90 minutes, excluding procedure time. I have rounded with the subspecialists taking care of this patient.

## 2022-09-01 PROBLEM — Z00.129 WELL CHILD VISIT: Status: ACTIVE | Noted: 2022-01-01

## 2023-05-16 NOTE — PROGRESS NOTE PEDS - ASSESSMENT
1d old male with prenatal concern for infradiaphragmatic TAPVR and maternal placental percreta, s/p planned CS @ 34 weeks on 8/29/22. Baby cried initially but then noted to be mottled and intubated. During UVC placement, noted to have meconium from umbilical vein and procedure aborted. Gen Surgery has suspicion for meckel's diverticulum. Patient with worsening hypoxia and labile hemodynamics and cannulated emergently to [facility]O. Birth weight: 2.1kg.        Labs and XRays all reviewed.    Plan:  Neuro-   Fentanyl infusion- titrate to appropriate sedation needs  Tylenol PRN  Avoid Toradol    Resp-   Full vent settings-> wean to "rest" vent settings  CXR now and AM  ABG Q1H until lactate cleared then Q4-6    CV-  ECMO CI 1.2, full flows  ABG Q1H until lactate cleared then Q4-6  EKG now  ECHO to be reviewed    FEN-  NPO/IVF  Monitor for hypoglycemia  Pepcid    AXR Q6 to r/o perforation    ID-  Zosyn as per Gen Surg recs    Heme-  labs per ECMO protocol  transfusion per ECMO protocol   Will start Heparin now and titrate per ECMO protocol  Vit K PO as per NICU recs     Head US  Renal US    Awaiting further Gen Surg recs  Appreciate NICU recs    Plan discussed with Peds Cardiology and CT Surgery.     CCM 90 minutes. 1d old male with prenatal concern for infradiaphragmatic TAPVR and maternal placental percreta, s/p planned CS @ 34 weeks on 8/29/22, cannulated to ECMO on DOL 0. Noted to have bowel perforation, s/p peritoneal drain 8/30/22.     Plan:  Neuro-   Fentanyl infusion- titrate to appropriate sedation needs  May use Vecuronium PRN   Tylenol PRN  Avoid Toradol  8/29 Head US normal- will obtain daily     Resp-   Rest vent settings  CXR Q AM  ABG Q2H until lactate cleared then Q6    CV-  ECMO CI 1.1-1.3, full flows , sweep 0.8, no clots   MAP >35  Lactate cleared   EKG now    FEN-  NPO/IVF D10  Monitor for hypoglycemia  Pepcid    AXR with abd perf  No significant ANN MARIE   8/29 Renal US normal     ID-  Zosyn as per Gen Surg recs    Heme-  labs per ECMO protocol  transfusion per ECMO protocol   Heparin infusion- titrate per ECMO protocol  s/p Vit K PO as per NICU recs     Plan for Gen Surg to explore abdomen today at bedside.   Appreciate NICU recs    Plan discussed with Peds Cardiology and CT Surgery.     CCM 90 minutes 1d old male with prenatal concern for infradiaphragmatic TAPVR and maternal placental percreta, s/p planned CS @ 34 weeks on 8/29/22, cannulated to ECMO on DOL 0. Noted to have bowel perforation, s/p peritoneal drain 8/30/22.     Plan:  Neuro-   Fentanyl infusion- titrate to appropriate sedation needs  May use Vecuronium PRN   Tylenol PRN  Avoid Toradol  8/29 Head US normal- will obtain daily     Resp-   Rest vent settings  CXR Q AM  ABG Q2H until lactate cleared then Q6    CV-  ECMO CI 1.1-1.3, full flows , sweep 0.8, no clots   MAP >35  Lactate cleared   EKG now    FEN-  NPO/IVF D10  Will order "starter" TPN  Monitor for hypoglycemia  Pepcid    AXR with abd perf  No significant ANN MARIE   Hypoalbumiemia- will consider albumin resuscitation   8/29 Renal US normal     ID-  Zosyn as per Gen Surg recs    Heme-  labs per ECMO protocol  transfusion per ECMO protocol   Heparin infusion- titrate per ECMO protocol  s/p Vit K PO as per NICU recs     Plan for Gen Surg to explore abdomen today at bedside.   Appreciate NICU recs    Plan discussed with Peds Cardiology and CT Surgery.     CCM 90 minutes No
